# Patient Record
Sex: MALE | Race: WHITE | NOT HISPANIC OR LATINO | URBAN - METROPOLITAN AREA
[De-identification: names, ages, dates, MRNs, and addresses within clinical notes are randomized per-mention and may not be internally consistent; named-entity substitution may affect disease eponyms.]

---

## 2017-01-20 ENCOUNTER — APPOINTMENT (RX ONLY)
Dept: URBAN - METROPOLITAN AREA CLINIC 23 | Facility: CLINIC | Age: 32
Setting detail: DERMATOLOGY
End: 2017-01-20

## 2017-01-20 DIAGNOSIS — D22 MELANOCYTIC NEVI: ICD-10-CM

## 2017-01-20 DIAGNOSIS — Z87.2 PERSONAL HISTORY OF DISEASES OF THE SKIN AND SUBCUTANEOUS TISSUE: ICD-10-CM

## 2017-01-20 DIAGNOSIS — L81.4 OTHER MELANIN HYPERPIGMENTATION: ICD-10-CM

## 2017-01-20 PROBLEM — D22.61 MELANOCYTIC NEVI OF RIGHT UPPER LIMB, INCLUDING SHOULDER: Status: ACTIVE | Noted: 2017-01-20

## 2017-01-20 PROBLEM — L85.3 XEROSIS CUTIS: Status: ACTIVE | Noted: 2017-01-20

## 2017-01-20 PROBLEM — L55.1 SUNBURN OF SECOND DEGREE: Status: ACTIVE | Noted: 2017-01-20

## 2017-01-20 PROBLEM — D22.5 MELANOCYTIC NEVI OF TRUNK: Status: ACTIVE | Noted: 2017-01-20

## 2017-01-20 PROBLEM — L70.0 ACNE VULGARIS: Status: ACTIVE | Noted: 2017-01-20

## 2017-01-20 PROCEDURE — ? MEDICAL PHOTOGRAPHY REVIEW

## 2017-01-20 PROCEDURE — ? OTHER

## 2017-01-20 PROCEDURE — ? COUNSELING

## 2017-01-20 PROCEDURE — 99213 OFFICE O/P EST LOW 20 MIN: CPT

## 2017-01-20 ASSESSMENT — LOCATION DETAILED DESCRIPTION DERM
LOCATION DETAILED: RIGHT CENTRAL MALAR CHEEK
LOCATION DETAILED: LEFT SUPERIOR UPPER BACK
LOCATION DETAILED: EPIGASTRIC SKIN
LOCATION DETAILED: LEFT LATERAL ABDOMEN
LOCATION DETAILED: LEFT MID-UPPER BACK
LOCATION DETAILED: RIGHT LATERAL INFERIOR CHEST
LOCATION DETAILED: LEFT CENTRAL MALAR CHEEK
LOCATION DETAILED: RIGHT POSTERIOR SHOULDER
LOCATION DETAILED: LEFT POSTERIOR SHOULDER
LOCATION DETAILED: RIGHT INFERIOR LATERAL MIDBACK

## 2017-01-20 ASSESSMENT — LOCATION SIMPLE DESCRIPTION DERM
LOCATION SIMPLE: LEFT SHOULDER
LOCATION SIMPLE: RIGHT SHOULDER
LOCATION SIMPLE: LEFT UPPER BACK
LOCATION SIMPLE: LEFT CHEEK
LOCATION SIMPLE: RIGHT LOWER BACK
LOCATION SIMPLE: CHEST
LOCATION SIMPLE: RIGHT CHEEK
LOCATION SIMPLE: ABDOMEN

## 2017-01-20 ASSESSMENT — LOCATION ZONE DERM
LOCATION ZONE: ARM
LOCATION ZONE: TRUNK
LOCATION ZONE: FACE

## 2017-01-20 NOTE — PROCEDURE: OTHER
Detail Level: Zone
Note Text (......Xxx Chief Complaint.): This diagnosis correlates with the
Other (Free Text): No evidence of recurrence at either site. Discussed that punch was transected but rest of mole may have resolved from biopsy site inflammation. Discussed that we are unsure if it would progress to melanoma. Since clinically removed, he wants to monitor and not re excise. His wife will monitor, and he will call w any pigmentation.

## 2017-01-20 NOTE — PROCEDURE: MEDICAL PHOTOGRAPHY REVIEW
Review Findings: no new or changing lesions
Number Of Photographs Reviewed: 2
Detail Level: Detailed